# Patient Record
Sex: MALE | Race: BLACK OR AFRICAN AMERICAN | NOT HISPANIC OR LATINO | URBAN - METROPOLITAN AREA
[De-identification: names, ages, dates, MRNs, and addresses within clinical notes are randomized per-mention and may not be internally consistent; named-entity substitution may affect disease eponyms.]

---

## 2018-03-15 ENCOUNTER — APPOINTMENT (RX ONLY)
Dept: URBAN - METROPOLITAN AREA CLINIC 44 | Facility: CLINIC | Age: 48
Setting detail: DERMATOLOGY
End: 2018-03-15

## 2018-03-15 ENCOUNTER — APPOINTMENT (RX ONLY)
Dept: URBAN - METROPOLITAN AREA CLINIC 45 | Facility: CLINIC | Age: 48
Setting detail: DERMATOLOGY
End: 2018-03-15

## 2018-03-15 DIAGNOSIS — L91.0 HYPERTROPHIC SCAR: ICD-10-CM

## 2018-03-15 PROBLEM — E78.5 HYPERLIPIDEMIA, UNSPECIFIED: Status: ACTIVE | Noted: 2018-03-15

## 2018-03-15 PROBLEM — I10 ESSENTIAL (PRIMARY) HYPERTENSION: Status: ACTIVE | Noted: 2018-03-15

## 2018-03-15 PROCEDURE — ? COUNSELING

## 2018-03-15 PROCEDURE — 11900 INJECT SKIN LESIONS </W 7: CPT

## 2018-03-15 PROCEDURE — ? ADDITIONAL NOTES

## 2018-03-15 PROCEDURE — ? INTRALESIONAL KENALOG

## 2018-03-15 ASSESSMENT — LOCATION DETAILED DESCRIPTION DERM
LOCATION DETAILED: STERNUM
LOCATION DETAILED: RIGHT SUPERIOR UPPER BACK
LOCATION DETAILED: RIGHT SUPERIOR UPPER BACK
LOCATION DETAILED: STERNUM

## 2018-03-15 ASSESSMENT — LOCATION ZONE DERM
LOCATION ZONE: TRUNK
LOCATION ZONE: TRUNK

## 2018-03-15 ASSESSMENT — LOCATION SIMPLE DESCRIPTION DERM
LOCATION SIMPLE: RIGHT UPPER BACK
LOCATION SIMPLE: CHEST
LOCATION SIMPLE: CHEST
LOCATION SIMPLE: RIGHT UPPER BACK

## 2018-03-15 NOTE — HPI: EVALUATION OF SKIN LESION(S)
Hpi Title: Evaluation of Skin Lesions
How Severe Are Your Spot(S)?: moderate
Have Your Spot(S) Been Treated In The Past?: has been treated
When Was It Treated?: 2017
Additional History: Patient here for evaluation of keloids he has on his chest and back that he states he has had for over 3 years patient states they have been treated before with steroid injections last injection was 2017, by the [facility] in [place]. Patient states they have become irritated and they also are enlarging.

## 2018-03-15 NOTE — PROCEDURE: INTRALESIONAL KENALOG
Total Volume (Ccs): 2.7
X Size Of Lesion In Cm (Optional): 0
Consent: The risks of atrophy were reviewed with the patient.
Detail Level: Detailed
Concentration Of Kenalog Solution Injected (Mg/Ml): 20.0
Medical Necessity Clause: This procedure was medically necessary because the lesions that were treated were:
Administered By (Optional): Dr. Watt
Lot # For Kenalog (Optional): [id number]
Expiration Date For Kenalog (Optional): nov 2018
Treatment Number (Optional): 1
Kenalog Preparation: Kenalog
Include Z78.9 (Other Specified Conditions Influencing Health Status) As An Associated Diagnosis?: No

## 2018-03-15 NOTE — PROCEDURE: MIPS QUALITY
Quality 431: Preventive Care And Screening: Unhealthy Alcohol Use - Screening: Patient screened for unhealthy alcohol use using a single question and scores less than 2 times per year
Quality 130: Documentation Of Current Medications In The Medical Record: Current Medications Documented
Quality 226: Preventive Care And Screening: Tobacco Use: Screening And Cessation Intervention: Patient screened for tobacco and never smoked
Detail Level: Detailed
Quality 110: Preventive Care And Screening: Influenza Immunization: Influenza Immunization not Administered because Patient Refused.

## 2018-03-15 NOTE — HPI: EVALUATION OF SKIN LESION(S)
How Severe Are Your Spot(S)?: moderate
Have Your Spot(S) Been Treated In The Past?: has been treated
Hpi Title: Evaluation of Skin Lesions
Additional History: Patient here for evaluation of keloids he has on his chest and back that he states he has had for over 3 years patient states they have been treated before with steroid injections last injection was 2017, by the [facility] in [place]. Patient states they have become irritated and they also are enlarging.
When Was It Treated?: 2017

## 2018-03-15 NOTE — PROCEDURE: MIPS QUALITY
Quality 130: Documentation Of Current Medications In The Medical Record: Current Medications Documented
Quality 431: Preventive Care And Screening: Unhealthy Alcohol Use - Screening: Patient screened for unhealthy alcohol use using a single question and scores less than 2 times per year
Quality 226: Preventive Care And Screening: Tobacco Use: Screening And Cessation Intervention: Patient screened for tobacco and never smoked
Quality 110: Preventive Care And Screening: Influenza Immunization: Influenza Immunization not Administered because Patient Refused.
Detail Level: Detailed

## 2018-03-15 NOTE — PROCEDURE: ADDITIONAL NOTES
Additional Notes: Discussed with patient steroid injections are very safe, discussed the risk of skin atrophy which is exploited for benefit in this case.\\nInjection of KENALOG-20 on sternum was 1.1 cc; R back was 1.6 cc.\\n\\nNB, LIDO 2% was used at the diluent.

## 2018-03-15 NOTE — PROCEDURE: INTRALESIONAL KENALOG
Consent: The risks of atrophy were reviewed with the patient.
Lot # For Kenalog (Optional): [id number]
Detail Level: Detailed
Expiration Date For Kenalog (Optional): nov 2018
Treatment Number (Optional): 1
Kenalog Preparation: Kenalog
Concentration Of Kenalog Solution Injected (Mg/Ml): 20.0
Include Z78.9 (Other Specified Conditions Influencing Health Status) As An Associated Diagnosis?: No
Total Volume (Ccs): 2.7
Medical Necessity Clause: This procedure was medically necessary because the lesions that were treated were:
X Size Of Lesion In Cm (Optional): 0
Administered By (Optional): Dr. Chino

## 2019-01-03 ENCOUNTER — HOSPITAL ENCOUNTER (EMERGENCY)
Facility: HOSPITAL | Age: 49
Discharge: HOME | End: 2019-01-03
Attending: EMERGENCY MEDICINE
Payer: OTHER GOVERNMENT

## 2019-01-03 VITALS
OXYGEN SATURATION: 97 % | HEIGHT: 67 IN | TEMPERATURE: 98 F | DIASTOLIC BLOOD PRESSURE: 98 MMHG | WEIGHT: 215 LBS | RESPIRATION RATE: 18 BRPM | BODY MASS INDEX: 33.74 KG/M2 | HEART RATE: 82 BPM | SYSTOLIC BLOOD PRESSURE: 146 MMHG

## 2019-01-03 DIAGNOSIS — S05.01XA ABRASION OF RIGHT CORNEA, INITIAL ENCOUNTER: Primary | ICD-10-CM

## 2019-01-03 PROCEDURE — 99284 EMERGENCY DEPT VISIT MOD MDM: CPT

## 2019-01-03 PROCEDURE — 25000003 PHARM REV CODE 250: Performed by: PHYSICIAN ASSISTANT

## 2019-01-03 RX ORDER — MOXIFLOXACIN 5 MG/ML
1 SOLUTION/ DROPS OPHTHALMIC 3 TIMES DAILY
Qty: 3 ML | Refills: 0 | Status: SHIPPED | OUTPATIENT
Start: 2019-01-03

## 2019-01-03 RX ORDER — TETRACAINE HYDROCHLORIDE 5 MG/ML
2 SOLUTION OPHTHALMIC
Status: COMPLETED | OUTPATIENT
Start: 2019-01-03 | End: 2019-01-03

## 2019-01-03 RX ORDER — HYDROCODONE BITARTRATE AND ACETAMINOPHEN 5; 325 MG/1; MG/1
1 TABLET ORAL EVERY 6 HOURS PRN
Qty: 4 TABLET | Refills: 0 | Status: SHIPPED | OUTPATIENT
Start: 2019-01-03

## 2019-01-03 RX ADMIN — TETRACAINE HYDROCHLORIDE 2 DROP: 5 SOLUTION OPHTHALMIC at 09:01

## 2019-01-03 RX ADMIN — FLUORESCEIN SODIUM 1 EACH: 1 STRIP OPHTHALMIC at 09:01

## 2019-01-04 NOTE — ED PROVIDER NOTES
"Encounter Date: 1/3/2019    SCRIBE #1 NOTE: I, JamilaJenny Douglas, am scribing for, and in the presence of,  Luiz Fuller PA-C. I have scribed the following portions of the note - Other sections scribed: HPI, ROS.       History     Chief Complaint   Patient presents with    Eye Injury     Pt dx with corneal abrasion earlier. Pt states he is unable to apply ointment. Pt says pain is worse now.      CC: Eye Injury    49 y/o male with HTN presents to the ED c/o acute onset mild (3/10) R eye pain that started x2 days ago. The patient initially thought he had a foreign body in his eye, so he attempted to flush his eye out with no relief. He states the pain progressively worsened yesterday. This morning, he woke up with "crusting" to his R eye, so [facility] where he was dx with a corneal abrasion. The patient was prescribed erythromycin ointment; however, he states he's unable to keep his eye open to put the ointment inside. The patient reports hx of refractive lens exchange x5 yrs ago to his b/l eyes. The patient denies wearing contacts. The patient denies blurred vision, fever, or chills. No other symptoms reported.      The history is provided by the patient. No  was used.     Review of patient's allergies indicates:   Allergen Reactions    Primaquine Other (See Comments)     G6PD deficiency and not able to take malaria meds.      Past Medical History:   Diagnosis Date    Hypertension      Past Surgical History:   Procedure Laterality Date    JOINT REPLACEMENT       No family history on file.  Social History     Tobacco Use    Smoking status: Never Smoker   Substance Use Topics    Alcohol use: Yes     Comment: occasionally    Drug use: No     Review of Systems   Constitutional: Negative for chills and fever.   HENT: Negative for congestion, ear pain, rhinorrhea and sore throat.    Eyes: Positive for pain (R side). Negative for redness and visual disturbance.   Respiratory: " Negative for cough and shortness of breath.    Cardiovascular: Negative for chest pain.   Gastrointestinal: Negative for abdominal pain, diarrhea, nausea and vomiting.   Genitourinary: Negative for decreased urine volume, difficulty urinating, dysuria, frequency, hematuria and urgency.   Musculoskeletal: Negative for back pain and neck pain.   Skin: Negative for rash.   Neurological: Negative for headaches.   Psychiatric/Behavioral: Negative for confusion.   All other systems reviewed and are negative.      Physical Exam     Initial Vitals [01/03/19 2029]   BP Pulse Resp Temp SpO2   (!) 146/98 82 18 97.9 °F (36.6 °C) 97 %      MAP       --         Physical Exam    Nursing note and vitals reviewed.  Constitutional: He appears well-developed and well-nourished. No distress.   HENT:   Head: Normocephalic and atraumatic.   Eyes: EOM and lids are normal. Pupils are equal, round, and reactive to light. Lids are everted and swept, no foreign bodies found. Right conjunctiva is injected. Right conjunctiva has no hemorrhage. Left conjunctiva is not injected. Left conjunctiva has no hemorrhage.   Slit lamp exam:       The right eye shows corneal abrasion and fluorescein uptake. The right eye shows no corneal ulcer, no foreign body, no hyphema and no anterior chamber bulge.   Neck: Normal range of motion. Neck supple.   Cardiovascular: Normal rate, regular rhythm and normal heart sounds. Exam reveals no gallop and no friction rub.    No murmur heard.  Pulmonary/Chest: Breath sounds normal. No respiratory distress. He has no wheezes. He has no rhonchi. He has no rales.   Abdominal: Soft. Bowel sounds are normal. He exhibits no mass. There is no tenderness. There is no rebound and no guarding.   Musculoskeletal: Normal range of motion.   Neurological: He is alert and oriented to person, place, and time.   Skin: Skin is warm and dry.   Psychiatric: He has a normal mood and affect.         ED Course   Procedures  Labs Reviewed - No  data to display       Imaging Results    None          Medical Decision Making:   ED Management:  This is an evaluation of a 48 y.o. male who presents to the ED for right eye pain.  Vital signs are stable.   Afebrile.  Patient is nontoxic appearing and in no acute distress. The right conjunctiva is injected.  Extraocular moves are intact. Pupils equal round reactive to light and accommodation. Slit lamp exam shows floor seen uptake over the lateral aspect of the cornea consistent with corneal abrasion.  Patient will be discharged home with Vigamox eyedrops.  Patient encouraged to follow up with Ophthalmology.    Patient given return precautions and instructed to return to the emergency department for any new or worsening symptoms. Patient verbalized understanding and agreed with plan.     I discussed the case with Dr. Poe who is in agreement with my assessment and plan.              Scribe Attestation:   Scribe #1: I performed the above scribed service and the documentation accurately describes the services I performed. I attest to the accuracy of the note.    Attending Attestation:   Physician Attestation Statement for Resident:  As the supervising MD  I agree with the above history. -:   As the supervising MD I agree with the above PE.    As the supervising MD I agree with the above treatment, course, plan, and disposition.   -: I have staffed the patient with the midlevel provider and was available for consultation in the department. I have guided the treatment plan and agree with the care provided.            Physician Attestation for Scribe:  Physician Attestation Statement for Scribe #1: I, Luiz Fuller PA-C, reviewed documentation, as scribed by Sathish Cole in my presence, and it is both accurate and complete.                    Clinical Impression:   The encounter diagnosis was Abrasion of right cornea, initial encounter.                             Luiz Seaman PA-C  01/04/19 0208        Bishop Poe MD  01/04/19 [id number]

## 2019-01-04 NOTE — ED TRIAGE NOTES
Patient arrived to ED with c/o of right eye pain and foreign body x 2 days.  Was seen at [facility] wand dx with corneal abrasion but reports that symptoms are getting worse and he is unable to apply ointment.  No acute distress noted